# Patient Record
(demographics unavailable — no encounter records)

---

## 2025-01-17 NOTE — DISCUSSION/SUMMARY
[FreeTextEntry1] : Annual Gyn  --GC/Chlam done --STD blood work done --SBE monthly --Diet and exercise  Bacterial Vaginosis --discharge and symptoms c/w BV --Affirm done --Clindamycin cream QHS --Fluconazole PO once --Vag care and hygiene discussed  Menorrhagia --unhappy with frequent heavy bleeding --requesting IUD removal --IUD removed without incident --STD counseling, safe sex practices and condoms stressed Follow up for annual/prn

## 2025-01-17 NOTE — COUNSELING
[Nutrition/ Exercise/ Weight Management] : nutrition, exercise, weight management [Breast Self Exam] : breast self exam [Contraception/ Emergency Contraception/ Safe Sexual Practices] : contraception, emergency contraception, safe sexual practices [STD (testing, results, tx)] : STD (testing, results, tx) [Lab Results] : lab results [Medication Management] : medication management

## 2025-01-17 NOTE — PROCEDURE
[IUD Removal] : intrauterine device (IUD) removal [Time out performed] : Pre-procedure time out performed.  Patient's name, date of birth and procedure confirmed. [Consent Obtained] : Consent obtained [Menorrhagia] : menorrhagia [Risks] : risks [Benefits] : benefits [Alternatives] : alternatives [Patient] : patient [Speculum Placed] : speculum placed [IUD Removed - Forceps] : IUD removed - forceps [IUD Discarded] : IUD discarded [Tolerated Well] : Patient tolerated the procedure well [No Complications] : no complications [Heavy Vaginal Bleeding] : for heavy vaginal bleeding

## 2025-01-17 NOTE — REASON FOR VISIT
[Annual] : an annual visit. [Menorrhagia] : menorrhagia [Abnormal Uterine Bleeding] : abnormal uterine bleeding [Vulvar/Vaginal Complaint] : vulvar/vaginal complaint

## 2025-01-17 NOTE — PHYSICAL EXAM
[Chaperone Present] : A chaperone was present in the examining room during all aspects of the physical examination [50016] : A chaperone was present during the pelvic exam. [FreeTextEntry2] : Kylah [Appropriately responsive] : appropriately responsive [Alert] : alert [No Acute Distress] : no acute distress [No Lymphadenopathy] : no lymphadenopathy [Soft] : soft [Non-tender] : non-tender [Non-distended] : non-distended [No HSM] : No HSM [No Lesions] : no lesions [No Mass] : no mass [Oriented x3] : oriented x3 [Examination Of The Breasts] : a normal appearance [No Masses] : no breast masses were palpable [Labia Majora] : normal [Labia Minora] : normal [Moderate] : There was moderate vaginal bleeding [IUD String] : an IUD string was noted [Normal] : normal [Uterine Adnexae] : normal

## 2025-01-17 NOTE — HISTORY OF PRESENT ILLNESS
[Patient reported PAP Smear was normal] : Patient reported PAP Smear was normal [HIV Test offered] : HIV Test offered [Syphilis test offered] : Syphilis test offered [Gonorrhea test offered] : Gonorrhea test offered [Chlamydia test offered] : Chlamydia test offered [Trichomonas test offered] : Trichomonas test offered [Hepatitis B test offered] : Hepatitis B test offered [Hepatitis C test offered] : Hepatitis C test offered [Definite ___ (Date)] : the last menstrual period was [unfilled] [Abnormal Duration ___ days] : the duration was abnormal lasting [unfilled] days [Excessive Bleeding] : bleeding has been excessive [Irregular Cycle Intervals] : are  irregular [Frequency: Q ___ days] : occur approximately every [unfilled] days [Y] : Patient uses contraception [IUD] : has an intrauterine device [N] : Patient denies prior pregnancies [TextBox_4] : 25 yo  LMP 1/9/25 here for routine annual Gyn exam and requesting IUD removal.  Has Paragard IUD in place and c/o frequent heavy menstrual cycles.  Had bleeding every other week and periods lasting more than 10 days.  Also c/o recurrent BV which she attributes to IUD.  Uses probiotics regularly.  Not sexually active now and not interested in any other birth control. [PapSmeardate] : 09/23 [LMPDate] : 01/09/25 [Previously active] : previously active [Men] : men [No] : No [Yes] : Yes [Patient would like to be screened for STIs] : Patient would like to be screened for STIs [FreeTextEntry3] : IUD